# Patient Record
Sex: MALE | Race: BLACK OR AFRICAN AMERICAN | NOT HISPANIC OR LATINO | URBAN - METROPOLITAN AREA
[De-identification: names, ages, dates, MRNs, and addresses within clinical notes are randomized per-mention and may not be internally consistent; named-entity substitution may affect disease eponyms.]

---

## 2021-04-14 ENCOUNTER — LAB OUTSIDE AN ENCOUNTER (OUTPATIENT)
Dept: URBAN - METROPOLITAN AREA CLINIC 124 | Facility: CLINIC | Age: 36
End: 2021-04-14

## 2021-04-14 ENCOUNTER — OFFICE VISIT (OUTPATIENT)
Dept: URBAN - METROPOLITAN AREA CLINIC 124 | Facility: CLINIC | Age: 36
End: 2021-04-14
Payer: COMMERCIAL

## 2021-04-14 DIAGNOSIS — R19.5 LOOSE STOOLS: ICD-10-CM

## 2021-04-14 DIAGNOSIS — R19.4 CHANGE IN BOWEL HABITS: ICD-10-CM

## 2021-04-14 DIAGNOSIS — K62.89 CYST OF PERIANAL AREA: ICD-10-CM

## 2021-04-14 DIAGNOSIS — K62.5 RECTAL BLEEDING: ICD-10-CM

## 2021-04-14 PROCEDURE — 99204 OFFICE O/P NEW MOD 45 MIN: CPT | Performed by: INTERNAL MEDICINE

## 2021-04-14 RX ORDER — POLYETHYLENE GLYCOL 3350, SODIUM SULFATE, SODIUM CHLORIDE, POTASSIUM CHLORIDE, ASCORBIC ACID, SODIUM ASCORBATE 140-9-5.2G
AS DIRECTED KIT ORAL ONCE
Qty: 1 KIT | Refills: 0 | OUTPATIENT
Start: 2021-04-14 | End: 2021-04-15

## 2021-04-14 RX ORDER — CEPHALEXIN 500 MG/1
1 CAPSULE CAPSULE ORAL
Status: ACTIVE | COMMUNITY

## 2021-04-14 NOTE — PHYSICAL EXAM GASTROINTESTINAL
Abdomen , soft, nontender, nondistended , no guarding or rigidity , no masses palpable , normal bowel sounds , Liver and Spleen , no hepatomegaly present , no hepatosplenomegaly , liver nontender , spleen not palpable , Rectal , small external hemorrhoids, perianal skin lesion ?? sinus / fistula , normal sphincter tone , no internal hemorrhoids, rectal masses or bleeding present, chaperone present scottie

## 2021-04-14 NOTE — HPI-TODAY'S VISIT:
April 2021: chronic hemorrhoids X 2 months itchy , painful, intermittent symptoms. feels a perianal bump along with discharge. No family history of colon cancer / GI malignancies / IBD. Student ( medical issue). Intermittent rectal bleeding. Intermittent loose stools.

## 2022-07-11 ENCOUNTER — DASHBOARD ENCOUNTERS (OUTPATIENT)
Age: 37
End: 2022-07-11

## 2022-07-11 ENCOUNTER — OFFICE VISIT (OUTPATIENT)
Dept: URBAN - METROPOLITAN AREA CLINIC 92 | Facility: CLINIC | Age: 37
End: 2022-07-11
Payer: COMMERCIAL

## 2022-07-11 ENCOUNTER — WEB ENCOUNTER (OUTPATIENT)
Dept: URBAN - METROPOLITAN AREA CLINIC 92 | Facility: CLINIC | Age: 37
End: 2022-07-11

## 2022-07-11 VITALS
TEMPERATURE: 97.1 F | SYSTOLIC BLOOD PRESSURE: 115 MMHG | WEIGHT: 158 LBS | HEIGHT: 63 IN | HEART RATE: 91 BPM | DIASTOLIC BLOOD PRESSURE: 71 MMHG | BODY MASS INDEX: 28 KG/M2

## 2022-07-11 DIAGNOSIS — K61.0 ABSCESS, PERIANAL: ICD-10-CM

## 2022-07-11 DIAGNOSIS — E73.9 LACTOSE INTOLERANCE: ICD-10-CM

## 2022-07-11 PROBLEM — 782415009: Status: ACTIVE | Noted: 2022-07-11

## 2022-07-11 PROCEDURE — 99214 OFFICE O/P EST MOD 30 MIN: CPT | Performed by: INTERNAL MEDICINE

## 2022-07-11 RX ORDER — CEPHALEXIN 500 MG/1
1 CAPSULE CAPSULE ORAL
Status: ACTIVE | COMMUNITY

## 2022-07-11 NOTE — HPI-TODAY'S VISIT:
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  He was seen about a year or so ago by one of my partners.  He has done well since then although over the last couple days developed a raised area in his perianal region.  This was painful associated with chills and noted a bit of bleeding.  He was seen in the emergency room and primary care doctor he had some instrumentation to try to drain a perirectal abscess.  He was given antibiotics.  This improved his pain somewhat.  He is overall doing relatively well.  Last year he was recommended to have a fistulotomy with Dr. Ryland Hoang for a fistula in-ano.  He was unable to follow-up for that as he had to return to [facility] to deal with matters after the passing of his father.  He previously had alternating bowel habits.  Although he realized this was due to a lactose intolerance.  Once he cut dairy from his diet his bowel movements have been regular.

## 2024-11-08 ENCOUNTER — OFFICE VISIT (OUTPATIENT)
Dept: URBAN - METROPOLITAN AREA CLINIC 92 | Facility: CLINIC | Age: 39
End: 2024-11-08
Payer: COMMERCIAL

## 2024-11-08 VITALS
WEIGHT: 164 LBS | DIASTOLIC BLOOD PRESSURE: 83 MMHG | BODY MASS INDEX: 29.06 KG/M2 | HEIGHT: 63 IN | SYSTOLIC BLOOD PRESSURE: 124 MMHG | HEART RATE: 112 BPM | TEMPERATURE: 97.9 F

## 2024-11-08 DIAGNOSIS — E73.9 LACTOSE INTOLERANCE: ICD-10-CM

## 2024-11-08 DIAGNOSIS — K92.1 HEMATOCHEZIA: ICD-10-CM

## 2024-11-08 DIAGNOSIS — K80.20 GALLSTONES: ICD-10-CM

## 2024-11-08 PROCEDURE — 99213 OFFICE O/P EST LOW 20 MIN: CPT

## 2024-11-08 RX ORDER — CEPHALEXIN 500 MG/1
1 CAPSULE CAPSULE ORAL
Status: ON HOLD | COMMUNITY

## 2024-11-08 NOTE — HPI-TODAY'S VISIT:
Patient is a 39 year old male who presents for gallstones. Previously seen by Dr. Wolfe in 2022.  He has done well since then although over the last couple days developed a raised area in his perianal region.  This was painful associated with chills and noted a bit of bleeding.  He was seen in the emergency room and primary care doctor he had some instrumentation to try to drain a perirectal abscess.  He was given antibiotics.  This improved his pain somewhat.  He is overall doing relatively well.  Last year he was recommended to have a fistulotomy with Dr. Ryland Hoang for a fistula in-ano.  He was unable to follow-up for that as he had to return to [facility] to deal with matters after the passing of his father.  He previously had alternating bowel habits.  Although he realized this was due to a lactose intolerance.  Once he cut dairy from his diet his bowel movements have been regular. Colonoscopy was recommended but never done.  Today, patient reports he went to the ED at [facility] a few weeks ago and was noted to have gallstones. Liver enzymes were normal. Previously had stones noted by CT. Currently, having minimal 2/3 pain. Denies nausea, vomiting, or constitutional symptoms. Denies constipation, diarrhea, or melena. Notes of occasional hemorrhoid flares and hematochezia. No perianal abscesses since his OV in 2022. Has never had a colonoscopy. Patient was given referral for general surgery. Cardiac exam done a week ago normal per patient. Denies lung or kidney issues.

## 2024-11-21 PROBLEM — 235919008: Status: ACTIVE | Noted: 2024-11-21

## 2024-12-27 ENCOUNTER — OFFICE VISIT (OUTPATIENT)
Dept: URBAN - METROPOLITAN AREA SURGERY CENTER 16 | Facility: SURGERY CENTER | Age: 39
End: 2024-12-27

## 2025-01-10 ENCOUNTER — OFFICE VISIT (OUTPATIENT)
Dept: URBAN - METROPOLITAN AREA TELEHEALTH 2 | Facility: TELEHEALTH | Age: 40
End: 2025-01-10
Payer: COMMERCIAL

## 2025-01-10 VITALS — WEIGHT: 164 LBS | HEIGHT: 63 IN | BODY MASS INDEX: 29.06 KG/M2

## 2025-01-10 DIAGNOSIS — E73.9 LACTOSE INTOLERANCE: ICD-10-CM

## 2025-01-10 DIAGNOSIS — K61.0 ABSCESS, PERIANAL: ICD-10-CM

## 2025-01-10 DIAGNOSIS — K92.1 HEMATOCHEZIA: ICD-10-CM

## 2025-01-10 DIAGNOSIS — K80.20 GALLSTONES: ICD-10-CM

## 2025-01-10 PROCEDURE — 98004 SYNCH AUDIO-VIDEO EST SF 10: CPT

## 2025-01-10 PROCEDURE — 99213 OFFICE O/P EST LOW 20 MIN: CPT

## 2025-01-10 PROCEDURE — 98005 SYNCH AUDIO-VIDEO EST LOW 20: CPT

## 2025-01-10 RX ORDER — CEPHALEXIN 500 MG/1
1 CAPSULE CAPSULE ORAL
COMMUNITY

## 2025-01-10 NOTE — HPI-TODAY'S VISIT:
Patient is a 39 year old male who presents in follow up from his colonoscopy. Previously seen by Dr. Wolfe in 2022.  He has done well since then although over the last couple days developed a raised area in his perianal region.  This was painful associated with chills and noted a bit of bleeding.  He was seen in the emergency room and primary care doctor he had some instrumentation to try to drain a perirectal abscess.  He was given antibiotics.  This improved his pain somewhat.  He is overall doing relatively well.  Last year he was recommended to have a fistulotomy with Dr. Ryland Hoang for a fistula in-ano.  He was unable to follow-up for that as he had to return to [facility] to deal with matters after the passing of his father.  He previously had alternating bowel habits.  Although he realized this was due to a lactose intolerance.  Once he cut dairy from his diet his bowel movements have been regular. Colonoscopy was recommended but never done.  11/08/24, patient reports he went to the ED at [facility] a few weeks ago and was noted to have gallstones. Liver enzymes were normal. Previously had stones noted by CT. Currently, having minimal 2/3 pain. Denies nausea, vomiting, or constitutional symptoms. Denies constipation, diarrhea, or melena. Notes of occasional hemorrhoid flares and hematochezia. No perianal abscesses since his OV in 2022. Has never had a colonoscopy. Patient was given referral for general surgery. Cardiac exam done a week ago normal per patient. Denies lung or kidney issues.  Colonoscopy 12/27/24 with Dr. Gomez Arellano revealed IH, no specimens collected, repeat at age 46  Today, via telephone, patient reports he is feeling well. No recent BRBPR, this occurs once every two months. Denies current abdominal pain. Patient saw GenSurg in regard to his gallstones but no surgical intervention was recommended. Denies constipation, diarrhea, melena, weight, or appetite changes.